# Patient Record
Sex: FEMALE | Race: WHITE | Employment: FULL TIME | URBAN - METROPOLITAN AREA
[De-identification: names, ages, dates, MRNs, and addresses within clinical notes are randomized per-mention and may not be internally consistent; named-entity substitution may affect disease eponyms.]

---

## 2020-10-31 ENCOUNTER — APPOINTMENT (OUTPATIENT)
Dept: GENERAL RADIOLOGY | Age: 35
End: 2020-10-31
Payer: COMMERCIAL

## 2020-10-31 ENCOUNTER — HOSPITAL ENCOUNTER (EMERGENCY)
Age: 35
Discharge: HOME | End: 2020-10-31
Attending: EMERGENCY MEDICINE
Payer: COMMERCIAL

## 2020-10-31 VITALS
BODY MASS INDEX: 23.95 KG/M2 | WEIGHT: 149 LBS | HEIGHT: 66 IN | HEART RATE: 62 BPM | OXYGEN SATURATION: 99 % | DIASTOLIC BLOOD PRESSURE: 59 MMHG | RESPIRATION RATE: 20 BRPM | SYSTOLIC BLOOD PRESSURE: 125 MMHG | TEMPERATURE: 98.7 F

## 2020-10-31 PROCEDURE — 99283 EMERGENCY DEPT VISIT LOW MDM: CPT

## 2020-10-31 PROCEDURE — 73610 X-RAY EXAM OF ANKLE: CPT

## 2020-10-31 RX ORDER — FLUTICASONE PROPIONATE 110 UG/1
1 AEROSOL, METERED RESPIRATORY (INHALATION) 2 TIMES DAILY
COMMUNITY

## 2020-10-31 RX ORDER — ALBUTEROL SULFATE 0.63 MG/3ML
1 SOLUTION RESPIRATORY (INHALATION) EVERY 6 HOURS PRN
COMMUNITY

## 2020-10-31 RX ORDER — ESCITALOPRAM OXALATE 10 MG/1
10 TABLET ORAL DAILY
COMMUNITY

## 2020-10-31 ASSESSMENT — PAIN SCALES - GENERAL: PAINLEVEL_OUTOF10: 10

## 2020-10-31 ASSESSMENT — PAIN DESCRIPTION - LOCATION: LOCATION: ANKLE

## 2020-10-31 ASSESSMENT — PAIN DESCRIPTION - DESCRIPTORS: DESCRIPTORS: ACHING

## 2020-10-31 ASSESSMENT — PAIN DESCRIPTION - ORIENTATION: ORIENTATION: LEFT

## 2020-10-31 ASSESSMENT — PAIN DESCRIPTION - PAIN TYPE: TYPE: ACUTE PAIN

## 2020-10-31 ASSESSMENT — PAIN DESCRIPTION - FREQUENCY: FREQUENCY: CONTINUOUS

## 2020-10-31 NOTE — LETTER
[facility] Emergency Department  [address] [address]  Phone: [phone]  Fax: [phone]             October 31, 2020    Patient: Cynthia Gamez   YOB: 1985   Date of Visit: 10/31/2020       To Whom It May Concern:    Cynthia Gamez was seen and treated in our emergency department on 10/31/2020.  She may return to Work/School on the following date _11/6/2020.___________      Sincerely,       Anjelica Staples RN         Signature:__________________________________

## 2020-10-31 NOTE — ED NOTES
Ankle brace placed via RT. Patient given d/c instructions with return verbalization. Patient has set of crutches from home adjusted to the right height,  Has used crutches for sprains multiple times. Given work note until 11/6/2020. Patient taken to car via [address] [address].      Dipak Gunter RN  10/31/20 [id number]

## 2020-10-31 NOTE — ED TRIAGE NOTES
Patient c/o ankle injury this afternoon, rolled ankle while walking. Has hx hypermobility syndrome. Obvious swelling and ecchymosis lateral malleolus.   NVS intact distally

## 2020-10-31 NOTE — ED PROVIDER NOTES
TRIAGE CHIEF COMPLAINT:   Chief Complaint   Patient presents with    Ankle Pain         HPI: Cynthia Gamez is a 28 y.o. female who presents to the Emergency Department with complaint of left ankle pain and swelling. Around noon today she was walking quickly toward her daughter when she stepped in a crack in the sidewalk and inverted her left ankle. Complains of pain and swelling on the lateral aspect. She states she sprained her right ankle about a week ago and is wearing a splint. She has a history of hypermobility syndrome and has frequent ankle sprains. Denies numbness or weakness. No calf knee or hip tenderness      REVIEW OF SYSTEMS:  6 systems reviewed. Pertinent positives per HPI. Otherwise noted to be negative. Nursing notes reviewed and agree with above. Past medical/surgical history reviewed. MEDICATIONS   Patient's Medications   New Prescriptions    No medications on file   Previous Medications    ALBUTEROL (ACCUNEB) 0.63 MG/3ML NEBULIZER SOLUTION    Take 1 ampule by nebulization every 6 hours as needed for Wheezing    ESCITALOPRAM (LEXAPRO) 10 MG TABLET    Take 10 mg by mouth daily    FLUTICASONE (FLOVENT HFA) 110 MCG/ACT INHALER    Inhale 1 puff into the lungs 2 times daily   Modified Medications    No medications on file   Discontinued Medications    No medications on file         ALLERGIES No Known Allergies      BP (!) 125/59   Pulse 62   Temp 98.7 °F (37.1 °C) (Oral)   Resp 20   Ht 5' 6\" (1.676 m)   Wt 149 lb (67.6 kg)   LMP  (LMP Unknown)   SpO2 99%   BMI 24.05 kg/m²   General:  No acute distress. Non toxic appearance  Head:   Normocephalic and atraumatic  Eyes:   Conjunctiva clear, CANDIDA, EOM's intact. ENT:   Mucous membranes moist  Neck:   Supple. No adenopathy.   Lungs/Chest:  No respiratory distress  CVS:   Regular rate and rhythm  Extremities:  Examination of the left lower extremity shows soft tissue swelling on the lateral aspect of the left ankle without definite deformity. No skin break. Minimal bruising noted laterally. There is no medial tenderness or foot tenderness. Distal neurovascular exam is intact. Knee is nontender. Skin:   No rashes or lesions to exposed skin  Neuro:  Alert and OX3. Speech clear and appropriate. No extremity weakness. Normal sensation in all extremities. No facial asymmetry. Gait normal.  Psych:   Affect normal. Mood normal        RADIOLOGY  XR ANKLE LEFT (MIN 3 VIEWS)   Final Result      No acute fracture. Lateral soft tissue swelling. LAB      ED COURSE / MDM:  40-year-old female with reported hypermobility syndrome, history of frequent ankle sprains, status post right ankle sprain a week ago wearing the splint who states she stepped in a crack today and inverted her left ankle. Presents with lateral pain swelling and slight bruising. Distal neurovascular exam is intact. No bony foot knee or hip tenderness. X-rays of the left ankle read by the radiologist and reviewed by myself shows no acute fracture. Patient does have soft tissue swelling and a metal suture into the lateral malleolus related to surgery for ankle sprain at age 21. She was given an Aircast.  She has already has crutches. I recommended rest ice and elevation along with Tylenol or ibuprofen if needed for pain. She was given a note for work this week. Advise follow-up with her primary care or orthopedic doctor      I discussed with Sebastian Solares the results of evaluation in the Emergency Department, diagnosis, care and prognosis. The plan is to discharge to home. The patient is in agreement with the plan and questions have been answered. I also discussed with the patient and/or family the reasons which may require a return visit and the importance of follow-up care.        (Please note that portions of this note may have been completed with a voice recognition program.  Efforts were made to edit the dictation but occasionally words are mis-transcribed)        FINAL IMPRESSION:  1 --left ankle sprain     Kendell Lewis MD  10/31/20 [id number]